# Patient Record
Sex: MALE | Race: WHITE | NOT HISPANIC OR LATINO | Employment: FULL TIME | URBAN - METROPOLITAN AREA
[De-identification: names, ages, dates, MRNs, and addresses within clinical notes are randomized per-mention and may not be internally consistent; named-entity substitution may affect disease eponyms.]

---

## 2023-03-22 LAB
ALANINE AMINOTRANSFERASE (SGPT) (U/L) IN SER/PLAS: 20 U/L (ref 10–52)
ALBUMIN (G/DL) IN SER/PLAS: 4.7 G/DL (ref 3.4–5)
ALBUMIN (MG/L) IN URINE: 72.8 MG/L
ALBUMIN/CREATININE (UG/MG) IN URINE: 40 UG/MG CRT (ref 0–30)
ALKALINE PHOSPHATASE (U/L) IN SER/PLAS: 72 U/L (ref 33–120)
ANION GAP IN SER/PLAS: 14 MMOL/L (ref 10–20)
ASPARTATE AMINOTRANSFERASE (SGOT) (U/L) IN SER/PLAS: 19 U/L (ref 9–39)
BILIRUBIN TOTAL (MG/DL) IN SER/PLAS: 0.5 MG/DL (ref 0–1.2)
CALCIDIOL (25 OH VITAMIN D3) (NG/ML) IN SER/PLAS: 44 NG/ML
CALCIUM (MG/DL) IN SER/PLAS: 9.9 MG/DL (ref 8.6–10.6)
CARBON DIOXIDE, TOTAL (MMOL/L) IN SER/PLAS: 28 MMOL/L (ref 21–32)
CHLORIDE (MMOL/L) IN SER/PLAS: 100 MMOL/L (ref 98–107)
CHOLESTEROL (MG/DL) IN SER/PLAS: 198 MG/DL (ref 0–199)
CHOLESTEROL IN HDL (MG/DL) IN SER/PLAS: 55.8 MG/DL
CHOLESTEROL/HDL RATIO: 3.5
CREATININE (MG/DL) IN SER/PLAS: 1.4 MG/DL (ref 0.5–1.3)
CREATININE (MG/DL) IN URINE: 182 MG/DL (ref 20–370)
ESTIMATED AVERAGE GLUCOSE FOR HBA1C: 183 MG/DL
GFR MALE: 72 ML/MIN/1.73M2
GLUCOSE (MG/DL) IN SER/PLAS: 182 MG/DL (ref 74–99)
HEMOGLOBIN A1C/HEMOGLOBIN TOTAL IN BLOOD: 8 %
LDL: 113 MG/DL (ref 0–119)
POTASSIUM (MMOL/L) IN SER/PLAS: 4.6 MMOL/L (ref 3.5–5.3)
PROTEIN TOTAL: 6.8 G/DL (ref 6.4–8.2)
SODIUM (MMOL/L) IN SER/PLAS: 137 MMOL/L (ref 136–145)
THYROTROPIN (MIU/L) IN SER/PLAS BY DETECTION LIMIT <= 0.05 MIU/L: 3.49 MIU/L (ref 0.44–3.98)
TRIGLYCERIDE (MG/DL) IN SER/PLAS: 148 MG/DL (ref 0–149)
UREA NITROGEN (MG/DL) IN SER/PLAS: 26 MG/DL (ref 6–23)
VLDL: 30 MG/DL (ref 0–40)

## 2023-10-23 ENCOUNTER — TELEPHONE (OUTPATIENT)
Dept: ENDOCRINOLOGY | Facility: CLINIC | Age: 23
End: 2023-10-23
Payer: COMMERCIAL

## 2023-10-23 DIAGNOSIS — E10.65 POOR CONTROL TYPE I DIABETES MELLITUS (MULTI): Primary | ICD-10-CM

## 2023-10-23 DIAGNOSIS — E10.65 POOR CONTROL TYPE I DIABETES MELLITUS (MULTI): ICD-10-CM

## 2023-10-23 RX ORDER — INSULIN LISPRO 100 [IU]/ML
INJECTION, SOLUTION INTRAVENOUS; SUBCUTANEOUS
Qty: 80 ML | Refills: 0 | Status: SHIPPED | OUTPATIENT
Start: 2023-10-23 | End: 2023-10-24 | Stop reason: SDUPTHER

## 2023-10-23 NOTE — TELEPHONE ENCOUNTER
Patient requested refill on Humalog.  Has not been seen since March.  Will send a short term refill and will need appt for future refills unless he is seeing another provider.

## 2023-10-24 DIAGNOSIS — E10.65 POOR CONTROL TYPE I DIABETES MELLITUS (MULTI): ICD-10-CM

## 2023-10-24 DIAGNOSIS — E10.65 TYPE 1 DIABETES MELLITUS WITH HYPERGLYCEMIA (MULTI): ICD-10-CM

## 2023-10-24 RX ORDER — INSULIN LISPRO 100 [IU]/ML
INJECTION, SOLUTION INTRAVENOUS; SUBCUTANEOUS
Qty: 80 ML | Refills: 0 | Status: SHIPPED | OUTPATIENT
Start: 2023-10-24 | End: 2023-10-24 | Stop reason: SDUPTHER

## 2023-10-24 RX ORDER — INSULIN LISPRO 100 [IU]/ML
INJECTION, SOLUTION INTRAVENOUS; SUBCUTANEOUS
Qty: 80 ML | Refills: 0 | Status: SHIPPED | OUTPATIENT
Start: 2023-10-24 | End: 2023-12-15 | Stop reason: SDUPTHER

## 2023-10-24 NOTE — TELEPHONE ENCOUNTER
Spoke to mother of  Shira Cody, she was asking about refill of Humalog. Explained to her that according to record Nataliia Laguna CNP sent a short term refill and that patient would have to be seen for future refills . Mother stated [facility]  had told her today that script was denied . [facility]oke to [facility] and they  stated they did not receive the short term script and only received the denial . Mother expressed understanding that patient needs to be seen and stated Shira Cody  Was in [employer] 8-6 Monday - Friday and missing one day os considered missing a week ., but that she would inform him he needs a follow up appointment . Script and encounter pended to provider . Kasandra Cristina LPN

## 2023-10-24 NOTE — TELEPHONE ENCOUNTER
[facility].  I cannot legally continue to prescribe a medication without seeing the patient at least once per year.

## 2023-12-15 ENCOUNTER — TELEPHONE (OUTPATIENT)
Dept: ENDOCRINOLOGY | Facility: CLINIC | Age: 23
End: 2023-12-15
Payer: COMMERCIAL

## 2023-12-15 DIAGNOSIS — E10.65 TYPE 1 DIABETES MELLITUS WITH HYPERGLYCEMIA (MULTI): ICD-10-CM

## 2023-12-15 DIAGNOSIS — E10.65 POOR CONTROL TYPE I DIABETES MELLITUS (MULTI): ICD-10-CM

## 2023-12-15 RX ORDER — INSULIN LISPRO 100 [IU]/ML
INJECTION, SOLUTION INTRAVENOUS; SUBCUTANEOUS
Qty: 80 ML | Refills: 0 | Status: SHIPPED | OUTPATIENT
Start: 2023-12-15 | End: 2024-01-23

## 2023-12-15 NOTE — TELEPHONE ENCOUNTER
Called and spoke with patient.  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  After this he worked out went to bed.  Overnight blood sugars climbed he woke up he was nauseous and vomiting and in DKA.  Requesting refill of his Humalog and will send a prescription.  Will keep appointment in January.  He would like to consider OmniPod 5

## 2023-12-15 NOTE — TELEPHONE ENCOUNTER
[facility] scheduling sent us a CRM message regarding Shira - He was recently hospitalized for DKA. He also asked about scheduling for a virtual visit for sooner than his February 21, 2024 appt - [facility] did not make this appt but just told office it needs to be done via the same CRM message they sent. This CRM also requested a renewal of insulin Rx on file.

## 2023-12-19 ENCOUNTER — APPOINTMENT (OUTPATIENT)
Dept: ENDOCRINOLOGY | Facility: CLINIC | Age: 23
End: 2023-12-19
Payer: COMMERCIAL

## 2023-12-19 NOTE — PROGRESS NOTES
Marcia Cody is a 23 y.o. male presents today for a follow up of DM Type ***.  Initial diagnosis with diabetes was at age 4. He was urinating and drinking a lot at this time. He had two seizures within the first year of being diagnosed. Thought the seizures were related to hypoglycemia. The patient denies family history of Type 1 or Type 2 diabetes. Denies autoimmune disease in the family.   Known complications include: denies     Previously seeing Dr. 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   Previous A1c 8.3% in 3/2022.   Since last visit, he was admitted for DKA  He is in the [employer] academy and was tazed   After this he worked out went to bed.    Overnight blood sugars climbed and he woke up he was nauseous, vomiting and in DKA   ***      Current diabetes regimen is as follows:   Humalog via Omnipod DASH***  Pump settings  Basal rate:  00:00 = 1.5  0300 1.65  200 = 1.45    Bolus:  Insulin:carb ratio  00:00= 1:8    Sensitivity  00:00= 28    Blood glucose target  00:00= 132    Active insulin 2.5 hr    Patient is using continuous glucose monitor - Dexcom G6  The patient is/is not*** currently checking the blood glucose *** times per day     Hypoglycemia frequency: ***  Hypoglycemia awareness: ***     Regarding symptoms of hyperglycemia, the patient is ***not experiencing any symptoms such as ***polyuria, polydipsia, nocturia or rapid weight loss or blurry vision. The patient comes into the office today ***.      Last foot exam: ***  Last eye exam:  Last urine albumin:  Last LDL:     ROS  General: no fever, chills or acute changes in weight in the last 6 months  Skin: no rashes, pruritis or dry skin  Cardiac: denies chest pain, heart palpitations or orthopnea  Pulmonary: denies wheezing, productive cough or exertional dyspnea      Objective    Physical Exam  There were no vitals taken for this  visit.  General: not in acute distress, cooperative   Respiratory: normal respiratory effort  Musculoskeletal: normal gait       Current Outpatient Medications:     insulin lispro (HumaLOG U-100 Insulin) 100 unit/mL injection, INJECT UP TO 80 UNITS PER INSULIN PUMP Needs a follow up visit for further refills., Disp: 80 mL, Rfl: 0    Assessment/Plan

## 2024-01-04 ENCOUNTER — TELEMEDICINE (OUTPATIENT)
Dept: ENDOCRINOLOGY | Facility: CLINIC | Age: 24
End: 2024-01-04
Payer: COMMERCIAL

## 2024-01-04 DIAGNOSIS — E10.65 TYPE 1 DIABETES MELLITUS WITH HYPERGLYCEMIA (MULTI): Primary | ICD-10-CM

## 2024-01-04 DIAGNOSIS — Z96.41 INSULIN PUMP STATUS: ICD-10-CM

## 2024-01-04 DIAGNOSIS — E10.649 HYPOGLYCEMIA DUE TO TYPE 1 DIABETES MELLITUS (MULTI): ICD-10-CM

## 2024-01-04 PROCEDURE — 95251 CONT GLUC MNTR ANALYSIS I&R: CPT | Performed by: NURSE PRACTITIONER

## 2024-01-04 PROCEDURE — 99214 OFFICE O/P EST MOD 30 MIN: CPT | Performed by: NURSE PRACTITIONER

## 2024-01-04 RX ORDER — INSULIN PMP CART,AUT,G6/7,CNTR
1 EACH SUBCUTANEOUS
Qty: 15 EACH | Refills: 3 | Status: SHIPPED | OUTPATIENT
Start: 2024-01-04 | End: 2024-01-11 | Stop reason: SDUPTHER

## 2024-01-04 RX ORDER — INSULIN PMP CART,AUT,G6/7,CNTR
1 EACH SUBCUTANEOUS
Qty: 1 EACH | Refills: 0 | Status: SHIPPED | OUTPATIENT
Start: 2024-01-04 | End: 2024-04-11 | Stop reason: SDUPTHER

## 2024-01-04 NOTE — PROGRESS NOTES
Subjective   Shira Cody is a 23 y.o. male presents today for a follow up of DM Type 1.  Initial diagnosis with diabetes was at age 4. He was urinating and drinking a lot at this time. He had two seizures within the first year of being diagnosed. Thought the seizures were related to hypoglycemia. The patient denies family history of Type 1 or Type 2 diabetes. Denies autoimmune disease in the family.   Known complications include: denies     Previously seeing Dr. Davis at [facility] for diabetes care  Previous A1c 8.3% in 3/2022.     He was recently admitted for DKA  He is in the [employer] and was tazed   After this, he states he worked out and went to bed.    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  He is doing 2 days on and 2 off, 3 days of and 3 off.    Sleeps from 630-7am until 230-3pm  Eats first meal around 230-3pm  2nd meal around 6pm  Dinner at midnight  Will try to work out around 4-5am and will eat after    He is having some lows.    He makes pump adjustments      Current diabetes regimen is as follows:   Humalog via Omnipod DASH- unable to download his pump today as we are not connected.      Patient is using continuous glucose monitor - Dexcom G6  The patient is currently checking the blood glucose as needed        Hypoglycemia frequency: 2% lows, <1% very low  Hypoglycemia awareness: Yes     The patient comes into the office today wanting to upgrade to Omnipod 5.        ROS  General: no fever, chills or acute changes in weight in the last 6 months  Skin: no rashes, pruritis or dry skin  Cardiac: denies chest pain, heart palpitations or orthopnea  Pulmonary: denies wheezing, productive cough or exertional dyspnea      Objective    Physical Exam  Unable to obtain blood pressure today due to virtual visit  General  Appearance: Well appearing, alert, in no acute distress, well-hydrated, well nourished.  Affect: alert, cooperative         Current Outpatient Medications:     insulin lispro (HumaLOG U-100 Insulin) 100 unit/mL injection, INJECT UP TO 80 UNITS PER INSULIN PUMP Needs a follow up visit for further refills., Disp: 80 mL, Rfl: 0      Assessment/Plan   Type 1 diabetes with hyperglycemia with long term use insulin  Hypoglycemia due to Type 1 diabetes  Insulin pump status:   - A1c not at goal.  TIR is not at goal.  Will transition to OP5 with current settings.  I recommend setting target and BG correct above 120 to start.  We briefly discussed activity mode and this would be recommend when he works out.  Could also be beneficial at times at work.  Discussed how pod will adjust to insulin need as he uses each pod.  May take time to adjust.  Stressed importance of short term follow ups to help with pump adjustments.  Will also contact omnipod rep to help support transition.      Plan:   Continue current pump settings  I will send you a link to connect to our office   Short term follow up with Marcos our pharmacist in mid Feb, sooner if needed  Follow up with me in 2-3 months virtually

## 2024-01-04 NOTE — Clinical Note
Patient seen virtually today.  Can you please assist with appt with Ebne for pump adjustments in mid Feb?  This would be ok virtual or in person his choice.  Then could you please arrange DM follow up with me in April?  Virtual or inperson his choice.  Thanks.  Nataliia Laguna, APRN-CNP

## 2024-01-11 ENCOUNTER — TELEPHONE (OUTPATIENT)
Dept: ENDOCRINOLOGY | Facility: CLINIC | Age: 24
End: 2024-01-11
Payer: COMMERCIAL

## 2024-01-11 DIAGNOSIS — E10.65 TYPE 1 DIABETES MELLITUS WITH HYPERGLYCEMIA (MULTI): ICD-10-CM

## 2024-01-11 RX ORDER — INSULIN PMP CART,AUT,G6/7,CNTR
1 EACH SUBCUTANEOUS
Qty: 45 EACH | Refills: 3 | Status: SHIPPED | OUTPATIENT
Start: 2024-01-11

## 2024-01-11 NOTE — TELEPHONE ENCOUNTER
Notice from [facility] that Rx for pods is under the 90 day qty limit. Please resend for a 90 day Rx.

## 2024-01-22 DIAGNOSIS — E10.65 TYPE 1 DIABETES MELLITUS WITH HYPERGLYCEMIA (MULTI): ICD-10-CM

## 2024-01-22 DIAGNOSIS — E10.65 POOR CONTROL TYPE I DIABETES MELLITUS (MULTI): ICD-10-CM

## 2024-01-23 RX ORDER — INSULIN LISPRO 100 [IU]/ML
INJECTION, SOLUTION INTRAVENOUS; SUBCUTANEOUS
Qty: 80 ML | Refills: 0 | Status: SHIPPED | OUTPATIENT
Start: 2024-01-23 | End: 2024-04-11 | Stop reason: SDUPTHER

## 2024-02-19 ENCOUNTER — APPOINTMENT (OUTPATIENT)
Dept: ENDOCRINOLOGY | Facility: CLINIC | Age: 24
End: 2024-02-19
Payer: COMMERCIAL

## 2024-02-21 ENCOUNTER — APPOINTMENT (OUTPATIENT)
Dept: ENDOCRINOLOGY | Facility: CLINIC | Age: 24
End: 2024-02-21
Payer: COMMERCIAL

## 2024-04-11 ENCOUNTER — TELEMEDICINE (OUTPATIENT)
Dept: ENDOCRINOLOGY | Facility: CLINIC | Age: 24
End: 2024-04-11
Payer: COMMERCIAL

## 2024-04-11 DIAGNOSIS — E10.65 POOR CONTROL TYPE I DIABETES MELLITUS (MULTI): ICD-10-CM

## 2024-04-11 DIAGNOSIS — E10.65 TYPE 1 DIABETES MELLITUS WITH HYPERGLYCEMIA (MULTI): ICD-10-CM

## 2024-04-11 PROCEDURE — 95251 CONT GLUC MNTR ANALYSIS I&R: CPT | Performed by: NURSE PRACTITIONER

## 2024-04-11 PROCEDURE — 99215 OFFICE O/P EST HI 40 MIN: CPT | Performed by: NURSE PRACTITIONER

## 2024-04-11 RX ORDER — INSULIN PMP CART,AUT,G6/7,CNTR
1 EACH SUBCUTANEOUS
Qty: 1 EACH | Refills: 0 | Status: SHIPPED | OUTPATIENT
Start: 2024-04-11

## 2024-04-11 RX ORDER — INSULIN LISPRO 100 [IU]/ML
INJECTION, SOLUTION INTRAVENOUS; SUBCUTANEOUS
Qty: 80 ML | Refills: 3 | Status: SHIPPED | OUTPATIENT
Start: 2024-04-11 | End: 2024-06-06 | Stop reason: SDUPTHER

## 2024-04-11 NOTE — PROGRESS NOTES
"Marcia Cody is a 23 y.o. male presents today for a follow up of DM Type 1.  Initial diagnosis with diabetes was at age 4. He was urinating and drinking a lot at this time. He had two seizures within the first year of being diagnosed. Thought the seizures were related to hypoglycemia. The patient denies family history of Type 1 or Type 2 diabetes. Denies autoimmune disease in the family.   Known complications include: denies   History of DKA in 10/2023    Previously seeing Dr. Davis at [facility] for diabetes care  Previous A1c 9.8% in 10/2023.  Prior A1c 8.3% in 3/2022.     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  He is doing 2 days on and 2 off, 3 days of and 3 off.    Sleeps from 9am until 3-4 pm   Eats first meal around 230-3pm  Eating before he goes to bed   Currently trying to gain weight so he is \"carb loading\"  Was seen in ED in March for vomiting due to norovirus       Current diabetes regimen is as follows:   Humalog via Omnipod DASH- unable to download his pump today as we are not connected.      Patient is using continuous glucose monitor - Dexcom G6  The patient is currently checking the blood glucose as needed      Hypoglycemia frequency: 1% lows, <1% very low  Hypoglycemia awareness: Yes     The patient comes into the office today without concerns.        ROS  General: no fever, chills or acute changes in weight in the last 6 months  Skin: no rashes, pruritis or dry skin  Cardiac: denies chest pain, heart palpitations or orthopnea  Pulmonary: denies wheezing, productive cough or exertional dyspnea        Objective    Physical Exam  Unable to obtain blood pressure today due to virtual visit  General Appearance: Well appearing, alert, in no acute distress, well-hydrated, well nourished.  Affect: alert, cooperative         Current Outpatient Medications:     insulin lispro (HumaLOG U-100 Insulin) 100 " unit/mL injection, INJECT UP TO 80 UNITS PER INSULIN PUMP Needs a follow up visit for further refills., Disp: 80 mL, Rfl: 0      Assessment/Plan   Type 1 diabetes with hyperglycemia with long term use insulin  Hypoglycemia due to Type 1 diabetes  Insulin pump status:   -A1c not at goal.  Time in range is not at goal.  Reinforced the need to get time in range increase.  Currently he is spending 74% time very high which is greater than 250.  He is currently eating large amounts of carbs to try to gain weight.  He does report covering these carbs.  He does try to run a little bit higher during work time as he cannot afford to have a low blood sugar while on the job.  Today given total daily dose will adjust his ISF to 35.  Unable to connect to glucose.  Discussed in person visit will be necessary for future pump adjustments.  Reinforced switching to OmniPod 5 sooner than later though he has a couple boxes of supplies left      Plan:   ISF changed to 35   All other pump settings remain the same for now   Upgrade to OP5 and please notify me if you are unable to get the intro kit   Send copy of insurance card via my chart for updating   Get updates labs and urine   Follow up in person July/August

## 2024-04-11 NOTE — Clinical Note
Can I see him in July/August for in person appt?  This would be DM follow up.  Ok to use NPV slot.  Thanks.  Nataliia Laguna, APRN-CNP

## 2024-04-12 ENCOUNTER — TELEPHONE (OUTPATIENT)
Dept: ENDOCRINOLOGY | Facility: CLINIC | Age: 24
End: 2024-04-12
Payer: COMMERCIAL

## 2024-04-26 ENCOUNTER — TELEPHONE (OUTPATIENT)
Dept: ENDOCRINOLOGY | Facility: CLINIC | Age: 24
End: 2024-04-26
Payer: COMMERCIAL

## 2024-06-06 DIAGNOSIS — E10.65 POOR CONTROL TYPE I DIABETES MELLITUS (MULTI): ICD-10-CM

## 2024-06-06 DIAGNOSIS — E10.65 TYPE 1 DIABETES MELLITUS WITH HYPERGLYCEMIA (MULTI): ICD-10-CM

## 2024-06-06 NOTE — TELEPHONE ENCOUNTER
Patient switched  insurance and needs his medication through [facility] , requested insulin sent there, order pended to provider. Kasandra Cristina LPN

## 2024-06-07 ENCOUNTER — PATIENT MESSAGE (OUTPATIENT)
Dept: ENDOCRINOLOGY | Facility: CLINIC | Age: 24
End: 2024-06-07
Payer: COMMERCIAL

## 2024-06-07 DIAGNOSIS — E10.65 POOR CONTROL TYPE I DIABETES MELLITUS (MULTI): ICD-10-CM

## 2024-06-07 DIAGNOSIS — E10.65 TYPE 1 DIABETES MELLITUS WITH HYPERGLYCEMIA (MULTI): ICD-10-CM

## 2024-06-07 RX ORDER — INSULIN LISPRO 100 [IU]/ML
INJECTION, SOLUTION INTRAVENOUS; SUBCUTANEOUS
Qty: 30 ML | Refills: 1 | Status: SHIPPED | OUTPATIENT
Start: 2024-06-07

## 2024-06-07 RX ORDER — INSULIN LISPRO 100 [IU]/ML
INJECTION, SOLUTION INTRAVENOUS; SUBCUTANEOUS
Qty: 80 ML | Refills: 1 | Status: SHIPPED | OUTPATIENT
Start: 2024-06-07 | End: 2024-06-07 | Stop reason: SDUPTHER

## 2024-06-07 NOTE — TELEPHONE ENCOUNTER
Orders Placed This Encounter      insulin lispro (HumaLOG U-100 Insulin) 100 unit/mL injection

## 2024-06-13 DIAGNOSIS — E10.65 TYPE 1 DIABETES MELLITUS WITH HYPERGLYCEMIA (MULTI): ICD-10-CM

## 2024-06-13 DIAGNOSIS — E10.65 POOR CONTROL TYPE I DIABETES MELLITUS (MULTI): ICD-10-CM

## 2024-06-14 RX ORDER — INSULIN ASPART INJECTION 100 [IU]/ML
INJECTION, SOLUTION SUBCUTANEOUS
Refills: 0 | OUTPATIENT
Start: 2024-06-14

## 2024-06-14 NOTE — TELEPHONE ENCOUNTER
Called and spoke with patient.  He does not use Fiasp.  Will continue with Humalog.  He has this on file.

## 2024-07-05 ENCOUNTER — TELEPHONE (OUTPATIENT)
Dept: ENDOCRINOLOGY | Facility: CLINIC | Age: 24
End: 2024-07-05
Payer: COMMERCIAL

## 2024-07-05 NOTE — TELEPHONE ENCOUNTER
Patient left voicemail stating that [employer] has sent urgent DWO for supplies.  Contacted patient to notify that our office has not received any forms.   Gave patient fax number to confirm with [employer]

## 2024-07-05 NOTE — TELEPHONE ENCOUNTER
Received form for Omnipod 5  Signed by provider  Faxed to [employer] with confirmation  Form scanned to media

## 2024-07-10 NOTE — TELEPHONE ENCOUNTER
Left a VM today that he is out of pods. The Rx we sent is for Omnipod 5, but he has the original pods. He states he'll be transitioning to the 5 next month, but for now he is out of pods and doesn't know what to do. [facility] does not carry the old pods anymore.

## 2024-07-10 NOTE — TELEPHONE ENCOUNTER
Called and spoke with patient.  He is using his last Aeros pod.  He received DASH pods from [facility] and has refills.  He has the DASH PDM and will transfer over all settings.  I recommended if he needs any assistance to call [employer] technical support.  He told me he has the number.

## 2024-07-12 ENCOUNTER — TELEPHONE (OUTPATIENT)
Dept: ENDOCRINOLOGY | Facility: CLINIC | Age: 24
End: 2024-07-12
Payer: COMMERCIAL

## 2024-07-12 DIAGNOSIS — E10.65 TYPE 1 DIABETES MELLITUS WITH HYPERGLYCEMIA (MULTI): Primary | ICD-10-CM

## 2024-07-12 NOTE — TELEPHONE ENCOUNTER
LOV: 1/4/24  NOV: j8/21/24  Received [employer] for supplies.   Signed by provider  Patient's last A1C on file- 10/16/2023  Do not see C-Peptide.   Last labs ordered by NP on 4/11/24- patient has not completed   Do you want to add C-peptide?

## 2024-07-29 ENCOUNTER — LAB (OUTPATIENT)
Dept: LAB | Facility: LAB | Age: 24
End: 2024-07-29
Payer: COMMERCIAL

## 2024-07-29 DIAGNOSIS — E10.65 TYPE 1 DIABETES MELLITUS WITH HYPERGLYCEMIA (MULTI): ICD-10-CM

## 2024-07-29 LAB
25(OH)D3 SERPL-MCNC: 49 NG/ML (ref 30–100)
ALBUMIN SERPL BCP-MCNC: 4.7 G/DL (ref 3.4–5)
ALP SERPL-CCNC: 70 U/L (ref 33–120)
ALT SERPL W P-5'-P-CCNC: 16 U/L (ref 10–52)
ANION GAP SERPL CALC-SCNC: 9 MMOL/L (ref 10–20)
AST SERPL W P-5'-P-CCNC: 13 U/L (ref 9–39)
BILIRUB SERPL-MCNC: 1.2 MG/DL (ref 0–1.2)
BUN SERPL-MCNC: 17 MG/DL (ref 6–23)
C PEPTIDE SERPL-MCNC: 0.1 NG/ML (ref 0.7–3.9)
CALCIUM SERPL-MCNC: 9.5 MG/DL (ref 8.6–10.3)
CHLORIDE SERPL-SCNC: 103 MMOL/L (ref 98–107)
CHOLEST SERPL-MCNC: 150 MG/DL (ref 0–199)
CHOLESTEROL/HDL RATIO: 2.8
CO2 SERPL-SCNC: 29 MMOL/L (ref 21–32)
CREAT SERPL-MCNC: 1.21 MG/DL (ref 0.5–1.3)
CREAT UR-MCNC: 281 MG/DL (ref 20–370)
EGFRCR SERPLBLD CKD-EPI 2021: 85 ML/MIN/1.73M*2
EST. AVERAGE GLUCOSE BLD GHB EST-MCNC: 206 MG/DL
GLUCOSE SERPL-MCNC: 215 MG/DL (ref 74–99)
HBA1C MFR BLD: 8.8 %
HDLC SERPL-MCNC: 54.5 MG/DL
LDLC SERPL CALC-MCNC: 87 MG/DL
MICROALBUMIN UR-MCNC: 266.2 MG/L
MICROALBUMIN/CREAT UR: 94.7 UG/MG CREAT
NON HDL CHOLESTEROL: 96 MG/DL (ref 0–149)
POTASSIUM SERPL-SCNC: 4.1 MMOL/L (ref 3.5–5.3)
PROT SERPL-MCNC: 7 G/DL (ref 6.4–8.2)
SODIUM SERPL-SCNC: 137 MMOL/L (ref 136–145)
TRIGL SERPL-MCNC: 44 MG/DL (ref 0–149)
TSH SERPL-ACNC: 1.69 MIU/L (ref 0.44–3.98)
VLDL: 9 MG/DL (ref 0–40)

## 2024-07-29 PROCEDURE — 80053 COMPREHEN METABOLIC PANEL: CPT

## 2024-07-29 PROCEDURE — 82306 VITAMIN D 25 HYDROXY: CPT

## 2024-07-29 PROCEDURE — 84443 ASSAY THYROID STIM HORMONE: CPT

## 2024-07-29 PROCEDURE — 83036 HEMOGLOBIN GLYCOSYLATED A1C: CPT

## 2024-07-29 PROCEDURE — 82570 ASSAY OF URINE CREATININE: CPT

## 2024-07-29 PROCEDURE — 84681 ASSAY OF C-PEPTIDE: CPT

## 2024-07-29 PROCEDURE — 82043 UR ALBUMIN QUANTITATIVE: CPT

## 2024-07-29 PROCEDURE — 36415 COLL VENOUS BLD VENIPUNCTURE: CPT

## 2024-07-29 PROCEDURE — 80061 LIPID PANEL: CPT

## 2024-08-21 ENCOUNTER — APPOINTMENT (OUTPATIENT)
Dept: ENDOCRINOLOGY | Facility: CLINIC | Age: 24
End: 2024-08-21
Payer: COMMERCIAL

## 2024-08-29 DIAGNOSIS — E10.65 POOR CONTROL TYPE I DIABETES MELLITUS (MULTI): ICD-10-CM

## 2024-08-29 DIAGNOSIS — E10.65 TYPE 1 DIABETES MELLITUS WITH HYPERGLYCEMIA (MULTI): ICD-10-CM

## 2024-08-29 RX ORDER — INSULIN LISPRO 100 [IU]/ML
INJECTION, SOLUTION INTRAVENOUS; SUBCUTANEOUS
Qty: 30 ML | Refills: 1 | Status: SHIPPED | OUTPATIENT
Start: 2024-08-29

## 2024-12-10 ENCOUNTER — TRANSCRIBE ORDERS (OUTPATIENT)
Dept: SCHEDULING | Age: 24
End: 2024-12-10
Payer: COMMERCIAL

## 2024-12-10 DIAGNOSIS — Z02.1 ENCOUNTER FOR PRE-EMPLOYMENT EXAMINATION: Primary | ICD-10-CM

## 2024-12-19 ENCOUNTER — HOSPITAL ENCOUNTER (OUTPATIENT)
Dept: RADIOLOGY | Facility: HOSPITAL | Age: 24
Discharge: HOME | End: 2024-12-19
Payer: COMMERCIAL

## 2024-12-19 ENCOUNTER — HOSPITAL ENCOUNTER (OUTPATIENT)
Dept: CARDIOLOGY | Facility: HOSPITAL | Age: 24
Discharge: HOME | End: 2024-12-19
Payer: COMMERCIAL

## 2024-12-19 DIAGNOSIS — Z02.1 ENCOUNTER FOR PRE-EMPLOYMENT EXAMINATION: ICD-10-CM

## 2024-12-19 PROCEDURE — 71046 X-RAY EXAM CHEST 2 VIEWS: CPT | Performed by: RADIOLOGY

## 2024-12-19 PROCEDURE — 71046 X-RAY EXAM CHEST 2 VIEWS: CPT

## 2024-12-19 PROCEDURE — 93017 CV STRESS TEST TRACING ONLY: CPT

## 2024-12-19 PROCEDURE — 93016 CV STRESS TEST SUPVJ ONLY: CPT | Performed by: INTERNAL MEDICINE

## 2024-12-19 PROCEDURE — 93018 CV STRESS TEST I&R ONLY: CPT | Performed by: INTERNAL MEDICINE

## 2024-12-23 ENCOUNTER — APPOINTMENT (OUTPATIENT)
Dept: ENDOCRINOLOGY | Facility: CLINIC | Age: 24
End: 2024-12-23
Payer: COMMERCIAL

## 2024-12-23 VITALS
SYSTOLIC BLOOD PRESSURE: 145 MMHG | TEMPERATURE: 98.1 F | WEIGHT: 208.3 LBS | BODY MASS INDEX: 29.16 KG/M2 | HEART RATE: 76 BPM | HEIGHT: 71 IN | DIASTOLIC BLOOD PRESSURE: 90 MMHG

## 2024-12-23 DIAGNOSIS — E10.65 TYPE 1 DIABETES MELLITUS WITH HYPERGLYCEMIA (MULTI): Primary | ICD-10-CM

## 2024-12-23 DIAGNOSIS — E10.65 TYPE 1 DIABETES MELLITUS WITH HYPERGLYCEMIA (MULTI): ICD-10-CM

## 2024-12-23 DIAGNOSIS — Z96.41 INSULIN PUMP STATUS: ICD-10-CM

## 2024-12-23 LAB — POC HEMOGLOBIN A1C: 8.3 % (ref 4.2–6.5)

## 2024-12-23 PROCEDURE — 3008F BODY MASS INDEX DOCD: CPT | Performed by: NURSE PRACTITIONER

## 2024-12-23 PROCEDURE — 95251 CONT GLUC MNTR ANALYSIS I&R: CPT | Performed by: NURSE PRACTITIONER

## 2024-12-23 PROCEDURE — 3060F POS MICROALBUMINURIA REV: CPT | Performed by: NURSE PRACTITIONER

## 2024-12-23 PROCEDURE — 99215 OFFICE O/P EST HI 40 MIN: CPT | Performed by: NURSE PRACTITIONER

## 2024-12-23 PROCEDURE — 3077F SYST BP >= 140 MM HG: CPT | Performed by: NURSE PRACTITIONER

## 2024-12-23 PROCEDURE — 3080F DIAST BP >= 90 MM HG: CPT | Performed by: NURSE PRACTITIONER

## 2024-12-23 PROCEDURE — 1036F TOBACCO NON-USER: CPT | Performed by: NURSE PRACTITIONER

## 2024-12-23 PROCEDURE — 3048F LDL-C <100 MG/DL: CPT | Performed by: NURSE PRACTITIONER

## 2024-12-23 PROCEDURE — 83036 HEMOGLOBIN GLYCOSYLATED A1C: CPT | Performed by: NURSE PRACTITIONER

## 2024-12-23 PROCEDURE — 3052F HG A1C>EQUAL 8.0%<EQUAL 9.0%: CPT | Performed by: NURSE PRACTITIONER

## 2024-12-23 RX ORDER — INSULIN LISPRO 100 [IU]/ML
INJECTION, SOLUTION INTRAVENOUS; SUBCUTANEOUS
Qty: 80 ML | Refills: 0 | Status: SHIPPED | OUTPATIENT
Start: 2024-12-23 | End: 2024-12-23 | Stop reason: ALTCHOICE

## 2024-12-23 RX ORDER — INSULIN PMP CART,AUT,G6/7,CNTR
1 EACH SUBCUTANEOUS
Qty: 30 EACH | Refills: 3 | Status: SHIPPED | OUTPATIENT
Start: 2024-12-23

## 2024-12-23 RX ORDER — INSULIN LISPRO 100 [IU]/ML
INJECTION, SOLUTION INTRAVENOUS; SUBCUTANEOUS
Qty: 80 ML | Refills: 0 | OUTPATIENT
Start: 2024-12-23

## 2024-12-23 RX ORDER — INSULIN ASPART 100 [IU]/ML
INJECTION, SOLUTION INTRAVENOUS; SUBCUTANEOUS
Qty: 80 ML | Refills: 0 | Status: SHIPPED | OUTPATIENT
Start: 2024-12-23

## 2024-12-23 RX ORDER — INSULIN ASPART 100 [IU]/ML
INJECTION, SOLUTION INTRAVENOUS; SUBCUTANEOUS
Qty: 80 ML | Refills: 0 | Status: SHIPPED | OUTPATIENT
Start: 2024-12-23 | End: 2024-12-23

## 2024-12-23 ASSESSMENT — PATIENT HEALTH QUESTIONNAIRE - PHQ9
2. FEELING DOWN, DEPRESSED OR HOPELESS: NOT AT ALL
1. LITTLE INTEREST OR PLEASURE IN DOING THINGS: NOT AT ALL
SUM OF ALL RESPONSES TO PHQ9 QUESTIONS 1 AND 2: 0

## 2024-12-23 ASSESSMENT — ENCOUNTER SYMPTOMS
OCCASIONAL FEELINGS OF UNSTEADINESS: 0
LOSS OF SENSATION IN FEET: 0
DEPRESSION: 0

## 2024-12-23 NOTE — PROGRESS NOTES
"Marcia Cody is a 24 y.o. male presents today for a follow up of DM Type 1.  Initial diagnosis with diabetes was at age 4. He was urinating and drinking a lot at this time. He had two seizures within the first year of being diagnosed. Thought the seizures were related to hypoglycemia. The patient denies family history of Type 1 or Type 2 diabetes. Denies autoimmune disease in the family.   Known complications include: denies   History of DKA in 10/2023    A1c today 8.3%.  Previous A1c 8.8% in 7/2024  Last visit with me 4/11/2024  Since last visit, he is going to start [employer] training   [place]   He will have new insurance in Feb 2025      Current diabetes regimen is as follows:   Humalog via Omnipod DASH- unable to download his pump today   Scrolled through settings manually:   Basal:   0000 = 1.5  2330 = 1.5    Carb ratio:   0000 = 1:7    ISF:   0000 = 28    Target 100     Duration of insulin 2 hours.       Patient is using continuous glucose monitor - Dexcom G6  The patient is currently checking the blood glucose as needed      Hypoglycemia frequency: 3% lows, 1% very low  Hypoglycemia awareness: Yes     The patient comes into the office today with variable blood sugars.         ROS  General: no fever, chills or acute changes in weight in the last 6 months  Skin: no rashes, pruritis or dry skin  Cardiac: denies chest pain, heart palpitations or orthopnea  Pulmonary: denies wheezing, productive cough or exertional dyspnea        Objective    Physical Exam  Blood pressure 145/90, pulse 76, temperature 36.7 °C (98.1 °F), temperature source Tympanic, height 1.803 m (5' 11\"), weight 94.5 kg (208 lb 4.8 oz).  General: not in acute distress, cooperative   Respiratory: normal respiratory effort  Musculoskeletal: normal gait       Current Outpatient Medications:     insulin lispro (HumaLOG U-100 Insulin) 100 unit/mL injection, INJECT UP TO 80 UNITS PER INSULIN PUMP Needs a " follow up visit for further refills., Disp: 80 mL, Rfl: 0      Assessment/Plan   Type 1 diabetes with hyperglycemia with long term use insulin  Insulin pump status:   - A1c not at goal.  Time in range is not at goal.  No pump changes today. Patient is going to start OP5  next week.  Will arrange pump training with PharmD.  He is going to switch from Dexcom G6 to Dexcom G7 and sample provided in office today.  He tolerated without complications.  New supplies sent to pharmacy and insulin sent locally as he is on his way out of town.  His insurance will change as of 2/1/2025 but should have supplies to get him until then.  Education provided to patient about pump today.  Sample OP5 controller also given to patient and he will get apps set up before appt.  Showed him the OP5 oliverio and what it is like to bolus.  Reinforced importance of lessening the lows.  Discussed how to treat lows with the new OP5 system, discussed may need less carbs to correct.      Sample Dexcom G7   Ref [id number]  Lot [id number]  Exp 10/31/2025      Plan:   Meet with Marcos our pharmacist for the Omnipod G5 pump start    Will continue current settings for now    If you have any trouble getting on to the virtual appt, call the office [phone]  Follow up 3 months virtually

## 2024-12-23 NOTE — TELEPHONE ENCOUNTER
Appears Novolog is covered.  Sent this over.  Patient updated of formulary change.  Discussed with him that he should not see any difference in using Novolog vs. Humalog.

## 2024-12-23 NOTE — PATIENT INSTRUCTIONS
Meet with Marcos our pharmacist for the Omnipod G5 pump start      Will continue current settings for now      If you have any trouble getting on to the virtual appt, call the office [phone]    Follow up 3 months virtually

## 2024-12-23 NOTE — TELEPHONE ENCOUNTER
Patient stopped by office and waiting, went to Pharmacy to pickup Rx for Humalog, Pharmacy stated he has to pay out of pocket, patient inquiring what are next steps.

## 2024-12-31 ENCOUNTER — TELEPHONE (OUTPATIENT)
Dept: ENDOCRINOLOGY | Facility: CLINIC | Age: 24
End: 2024-12-31

## 2024-12-31 ENCOUNTER — APPOINTMENT (OUTPATIENT)
Dept: ENDOCRINOLOGY | Facility: CLINIC | Age: 24
End: 2024-12-31
Payer: COMMERCIAL

## 2024-12-31 DIAGNOSIS — E10.65 TYPE 1 DIABETES MELLITUS WITH HYPERGLYCEMIA (MULTI): ICD-10-CM

## 2024-12-31 PROCEDURE — 99211 OFF/OP EST MAY X REQ PHY/QHP: CPT | Performed by: PHARMACIST

## 2024-12-31 NOTE — PROGRESS NOTES
Clinical [facility]acy [facility] for diabetes management thanks to a referral from Nataliia Laguna CNP. Below is a summary of our conversation and recommendations:  ________________________________________________________________________      Allergies   Allergen Reactions    Amoxicillin Hives    Bactrim [Sulfamethoxazole-Trimethoprim] Hives    Ceftin [Cefuroxime Axetil] Hives       Objective     There were no vitals taken for this visit.    Current Outpatient Medications on File Prior to Visit   Medication Sig Dispense Refill    insulin aspart (NovoLOG U-100 Insulin aspart) 100 unit/mL injection Inject up to 80 units once daily 80 mL 0    insulin pump cart,auto,BT,G6/7 (Omnipod 5 G6-G7 Pods, Gen 5,) cartridge Inject 1 each under the skin every 3 days. 30 each 3     No current facility-administered medications on file prior to visit.         Lab Review  Lab Results   Component Value Date    BILITOT 1.2 07/29/2024    CALCIUM 9.5 07/29/2024    CO2 29 07/29/2024     07/29/2024    CREATININE 1.21 07/29/2024    GLUCOSE 215 (H) 07/29/2024    ALKPHOS 70 07/29/2024    K 4.1 07/29/2024    PROT 7.0 07/29/2024     07/29/2024    AST 13 07/29/2024    ALT 16 07/29/2024    BUN 17 07/29/2024    ANIONGAP 9 (L) 07/29/2024    ALBUMIN 4.7 07/29/2024    GFRMALE 72 03/21/2023     Lab Results   Component Value Date    TRIG 44 07/29/2024    CHOL 150 07/29/2024    LDLCALC 87 07/29/2024    HDL 54.5 07/29/2024     Lab Results   Component Value Date    HGBA1C 8.3 (A) 12/23/2024    HGBA1C 8.8 (H) 07/29/2024    HGBA1C 9.8 (H) 10/16/2023     The ASCVD Risk score ([place] DK, et al., 2019) failed to calculate for the following reasons:    The 2019 ASCVD risk score is only valid for ages 40 to 79        Assessment/Plan     The patient reports today for a diabetes consultation and omnipod 5 training. Discussed differences between omnipod dash and omnipod 5. Diiscussed automode feature and how to  swich between automode and manual mode. Discussed using activity mode during periods of physcial activity to minimize hypoglycemia. Answered all patient questions. Patient was able to self apply pump without any issues. He is also using a dexcom G7. Initial pump settings are as follows:    Basal rate  0000 = 1.5 units/hr     Carb ratio:   0000 = 1:7     ISF:   0000 = 28     Target and correct above: 110 mg/dl     Duration of insulin 3 hours.     Patient was agreeable to these recommendations.         PATIENT EDUCATION/GOALS      Goals  Fasting B - 130 mg/dL  Postprandial BG: less than 180 mg/dL  A1c: less than 7%      Provided counseling on lifestyle modifications, medications, and self-monitoring. Patient has no additional questions at this time. Pharmacy to follow up in 2-3 weeks. Please reach out with any questions. Thank you.       Marcos Zohu, PharmD    Provider on site: Amy Pires MD  Continue all meds under the continuation of care with the referring provider and clinical pharmacy team.

## 2025-01-23 ENCOUNTER — TELEPHONE (OUTPATIENT)
Dept: ENDOCRINOLOGY | Facility: CLINIC | Age: 25
End: 2025-01-23
Payer: COMMERCIAL

## 2025-01-23 NOTE — TELEPHONE ENCOUNTER
Patient called concerned because his sugars has been getting low lately and he isn't sure why or what he should do, would like to possibly speak with Nataliia Laguna or someone else who could assist him. Is aware of his visit on 01/27 with Marcos Zhou.

## 2025-01-27 ENCOUNTER — APPOINTMENT (OUTPATIENT)
Dept: ENDOCRINOLOGY | Facility: CLINIC | Age: 25
End: 2025-01-27
Payer: COMMERCIAL

## 2025-02-10 ENCOUNTER — TELEPHONE (OUTPATIENT)
Dept: ENDOCRINOLOGY | Facility: CLINIC | Age: 25
End: 2025-02-10

## 2025-02-10 NOTE — TELEPHONE ENCOUNTER
Received electronic DWO/PO/CMN form from "[employer] via [address] to complete for CGM supplies. Form completed on 2/10 and submitted electronically.

## 2025-02-24 ENCOUNTER — APPOINTMENT (OUTPATIENT)
Dept: ENDOCRINOLOGY | Facility: CLINIC | Age: 25
End: 2025-02-24
Payer: COMMERCIAL

## 2025-03-14 ENCOUNTER — APPOINTMENT (OUTPATIENT)
Dept: ENDOCRINOLOGY | Facility: CLINIC | Age: 25
End: 2025-03-14
Payer: COMMERCIAL

## 2025-03-17 ENCOUNTER — APPOINTMENT (OUTPATIENT)
Dept: ENDOCRINOLOGY | Facility: CLINIC | Age: 25
End: 2025-03-17

## 2025-03-17 DIAGNOSIS — E10.65 TYPE 1 DIABETES MELLITUS WITH HYPERGLYCEMIA (MULTI): ICD-10-CM

## 2025-03-17 PROCEDURE — 99211 OFF/OP EST MAY X REQ PHY/QHP: CPT | Performed by: PHARMACIST

## 2025-03-17 PROCEDURE — 95251 CONT GLUC MNTR ANALYSIS I&R: CPT | Performed by: PHARMACIST

## 2025-03-17 NOTE — PROGRESS NOTES
Clinical Pharmacy [facility] for diabetes management thanks to a referral from Nataliia Laguna CNP. Below is a summary of our conversation and recommendations:  ________________________________________________________________________      Allergies   Allergen Reactions    Amoxicillin Hives    Bactrim [Sulfamethoxazole-Trimethoprim] Hives    Ceftin [Cefuroxime Axetil] Hives       Objective     There were no vitals taken for this visit.    Current Outpatient Medications on File Prior to Visit   Medication Sig Dispense Refill    insulin aspart (NovoLOG U-100 Insulin aspart) 100 unit/mL injection Inject up to 80 units once daily 80 mL 0    insulin pump cart,auto,BT,G6/7 (Omnipod 5 G6-G7 Pods, Gen 5,) cartridge Inject 1 each under the skin every 3 days. 30 each 3     No current facility-administered medications on file prior to visit.         Lab Review  Lab Results   Component Value Date    BILITOT 1.2 07/29/2024    CALCIUM 9.5 07/29/2024    CO2 29 07/29/2024     07/29/2024    CREATININE 1.21 07/29/2024    GLUCOSE 215 (H) 07/29/2024    ALKPHOS 70 07/29/2024    K 4.1 07/29/2024    PROT 7.0 07/29/2024     07/29/2024    AST 13 07/29/2024    ALT 16 07/29/2024    BUN 17 07/29/2024    ANIONGAP 9 (L) 07/29/2024    ALBUMIN 4.7 07/29/2024    GFRMALE 72 03/21/2023     Lab Results   Component Value Date    TRIG 44 07/29/2024    CHOL 150 07/29/2024    LDLCALC 87 07/29/2024    HDL 54.5 07/29/2024     Lab Results   Component Value Date    HGBA1C 8.3 (A) 12/23/2024    HGBA1C 8.8 (H) 07/29/2024    HGBA1C 9.8 (H) 10/16/2023     The ASCVD Risk score ([place] DK, et al., 2019) failed to calculate for the following reasons:    The 2019 ASCVD risk score is only valid for ages 40 to 79              Assessment/Plan     The patient reports today for a diabetes consultation. Reviewed pump report and discussed it with the patient. TIR is not at goal. Patient reports doing generally well but  having issues with posprandial hyperglycemia episodes. This is also reflected in his CGM data. Based on patient discussion and CGM recommend the following:    ICR 8 --> 7  Turn reverse correction off  Max basal rate 3 units/hr --->6 units /hr     Patient was agreeable to these recommendations.     A minimum of 72 hours of CGM data was reviewed and used to make therapy decisions         PATIENT EDUCATION/GOALS      Goals  Fasting B - 130 mg/dL  Postprandial BG: less than 180 mg/dL  A1c: less than 7%      Provided counseling on lifestyle modifications, medications, and self-monitoring. Patient has no additional questions at this time. Pharmacy to follow up in 6 weeks. Please reach out with any questions. Thank you.       Marcos Zhou, PharmD    Provider on site: Nataliia Laguna CNP  Continue all meds under the continuation of care with the referring provider and clinical pharmacy team.

## 2025-03-17 NOTE — Clinical Note
CGM interpretation was provided at this visit. Can this patient be scheduled for a 6 week follow up? Thank you

## 2025-03-31 ENCOUNTER — TELEPHONE (OUTPATIENT)
Dept: ENDOCRINOLOGY | Facility: CLINIC | Age: 25
End: 2025-03-31
Payer: COMMERCIAL

## 2025-03-31 ENCOUNTER — PATIENT MESSAGE (OUTPATIENT)
Dept: ENDOCRINOLOGY | Facility: CLINIC | Age: 25
End: 2025-03-31
Payer: COMMERCIAL

## 2025-03-31 DIAGNOSIS — E10.65 TYPE 1 DIABETES MELLITUS WITH HYPERGLYCEMIA (MULTI): ICD-10-CM

## 2025-03-31 RX ORDER — INSULIN PMP CART,AUT,G6/7,CNTR
1 EACH SUBCUTANEOUS
Qty: 30 EACH | Refills: 3 | Status: SHIPPED | OUTPATIENT
Start: 2025-03-31

## 2025-03-31 RX ORDER — BLOOD-GLUCOSE SENSOR
EACH MISCELLANEOUS
COMMUNITY
Start: 2025-03-31

## 2025-03-31 NOTE — TELEPHONE ENCOUNTER
Patient called regarding his omni pod.  New pods are not compatible with his G-7.  Wants to know if we have the correct ones in stock for  until his new order is filled by his pharmacy.  Please reach out to patient.  Thanks.

## 2025-03-31 NOTE — TELEPHONE ENCOUNTER
Called patient.  His pods only say G6 on the box, not G6/G7.  Called [facility] and they have no record of him on file.  Called patient back and was able to reach him the second time.  I will send G6/G7 pods to the local [facility] pharmacy.  He will try to  tonight.  If not has DASh and will start that tonight.

## 2025-04-21 ENCOUNTER — PATIENT MESSAGE (OUTPATIENT)
Dept: ENDOCRINOLOGY | Facility: CLINIC | Age: 25
End: 2025-04-21
Payer: COMMERCIAL

## 2025-04-21 DIAGNOSIS — E10.65 TYPE 1 DIABETES MELLITUS WITH HYPERGLYCEMIA (MULTI): ICD-10-CM

## 2025-04-21 RX ORDER — INSULIN ASPART 100 [IU]/ML
INJECTION, SOLUTION INTRAVENOUS; SUBCUTANEOUS
Qty: 80 ML | Refills: 2 | Status: SHIPPED | OUTPATIENT
Start: 2025-04-21 | End: 2025-04-23 | Stop reason: SDUPTHER

## 2025-04-22 DIAGNOSIS — E10.65 TYPE 1 DIABETES MELLITUS WITH HYPERGLYCEMIA (MULTI): ICD-10-CM

## 2025-04-23 ENCOUNTER — PATIENT MESSAGE (OUTPATIENT)
Dept: ENDOCRINOLOGY | Facility: CLINIC | Age: 25
End: 2025-04-23
Payer: COMMERCIAL

## 2025-04-23 DIAGNOSIS — E10.65 TYPE 1 DIABETES MELLITUS WITH HYPERGLYCEMIA (MULTI): ICD-10-CM

## 2025-04-23 RX ORDER — INSULIN ASPART 100 [IU]/ML
INJECTION, SOLUTION INTRAVENOUS; SUBCUTANEOUS
Qty: 80 ML | Refills: 3 | Status: SHIPPED | OUTPATIENT
Start: 2025-04-23

## 2025-04-23 RX ORDER — INSULIN PMP CART,AUT,G6/7,CNTR
1 EACH SUBCUTANEOUS
Refills: 3 | OUTPATIENT
Start: 2025-04-23

## 2025-04-23 NOTE — TELEPHONE ENCOUNTER
Patient my chart message states that PA is needed for Omnipods - I show that PA was last completed in December 2024 and is still valid through Dec 2025. There is no record of insurance change.

## 2025-04-24 NOTE — TELEPHONE ENCOUNTER
Called [employer] and was told to call MITUL Carter at [phone]. MITUL Carter transferred me to another person there, only to be told they are no longer handling the Pas for this insurance and that they are now processing their own. I was then provided this number [phone] for [employer] and spoke with Triny MARIN. She states that an urgent PA cannot be done, that he's had this policy for 2 months so this should have been addressed already. I was instructed that she will need a LMN with his medical Hx, his A1c and his current meds. All of this was faxed to attn: Triny VACA At [phone]

## 2025-04-25 RX ORDER — GLUCAGON INJECTION, SOLUTION 1 MG/.2ML
INJECTION, SOLUTION SUBCUTANEOUS
Qty: 0.4 ML | Refills: 11 | Status: SHIPPED | OUTPATIENT
Start: 2025-04-25

## 2025-04-25 RX ORDER — INSULIN GLARGINE 100 [IU]/ML
INJECTION, SOLUTION SUBCUTANEOUS
Qty: 15 ML | Refills: 3 | Status: SHIPPED | OUTPATIENT
Start: 2025-04-25

## 2025-05-05 ENCOUNTER — APPOINTMENT (OUTPATIENT)
Dept: ENDOCRINOLOGY | Facility: CLINIC | Age: 25
End: 2025-05-05
Payer: COMMERCIAL

## 2025-05-12 ENCOUNTER — APPOINTMENT (OUTPATIENT)
Dept: ENDOCRINOLOGY | Facility: CLINIC | Age: 25
End: 2025-05-12
Payer: COMMERCIAL

## 2025-05-16 NOTE — TELEPHONE ENCOUNTER
I have not received a faxed confirmation of the determination. I refaxed the request and asked that we receive determination. Triny called and left a VM that this was approved on  on 4/28 - patient was notified on that day. PA is approved till 4/29/26

## 2025-05-28 ENCOUNTER — TELEPHONE (OUTPATIENT)
Dept: ENDOCRINOLOGY | Facility: CLINIC | Age: 25
End: 2025-05-28
Payer: COMMERCIAL

## 2025-05-28 NOTE — TELEPHONE ENCOUNTER
Received electronic DWO/PO/CMN form from [employer] via [employer] to complete for pump supplies. Form completed on 5/28 and submitted electronically.

## 2025-08-11 ENCOUNTER — OFFICE VISIT (OUTPATIENT)
Dept: ORTHOPEDIC SURGERY | Facility: CLINIC | Age: 25
End: 2025-08-11
Payer: COMMERCIAL

## 2025-08-11 ENCOUNTER — HOSPITAL ENCOUNTER (OUTPATIENT)
Dept: RADIOLOGY | Facility: CLINIC | Age: 25
Discharge: HOME | End: 2025-08-11
Payer: COMMERCIAL

## 2025-08-11 DIAGNOSIS — M79.662 PAIN IN SHIN, LEFT: ICD-10-CM

## 2025-08-11 DIAGNOSIS — S86.892A SHIN SPLINT, LEFT, INITIAL ENCOUNTER: ICD-10-CM

## 2025-08-11 PROCEDURE — 73590 X-RAY EXAM OF LOWER LEG: CPT | Mod: LEFT SIDE | Performed by: ORTHOPAEDIC SURGERY

## 2025-08-11 PROCEDURE — 99202 OFFICE O/P NEW SF 15 MIN: CPT | Performed by: ORTHOPAEDIC SURGERY

## 2025-08-11 PROCEDURE — 1036F TOBACCO NON-USER: CPT | Performed by: ORTHOPAEDIC SURGERY

## 2025-08-11 PROCEDURE — 73590 X-RAY EXAM OF LOWER LEG: CPT | Mod: LT

## 2025-08-11 PROCEDURE — 99203 OFFICE O/P NEW LOW 30 MIN: CPT | Performed by: ORTHOPAEDIC SURGERY

## 2025-09-17 ENCOUNTER — APPOINTMENT (OUTPATIENT)
Dept: ENDOCRINOLOGY | Facility: CLINIC | Age: 25
End: 2025-09-17